# Patient Record
Sex: MALE | Race: WHITE | Employment: FULL TIME | URBAN - METROPOLITAN AREA
[De-identification: names, ages, dates, MRNs, and addresses within clinical notes are randomized per-mention and may not be internally consistent; named-entity substitution may affect disease eponyms.]

---

## 2024-02-22 ENCOUNTER — OFFICE VISIT (OUTPATIENT)
Dept: ENT CLINIC | Age: 49
End: 2024-02-22

## 2024-02-22 VITALS
WEIGHT: 161 LBS | RESPIRATION RATE: 16 BRPM | BODY MASS INDEX: 23.05 KG/M2 | HEIGHT: 70 IN | DIASTOLIC BLOOD PRESSURE: 73 MMHG | OXYGEN SATURATION: 98 % | SYSTOLIC BLOOD PRESSURE: 117 MMHG | HEART RATE: 86 BPM

## 2024-02-22 DIAGNOSIS — J34.89 NASAL OBSTRUCTION: ICD-10-CM

## 2024-02-22 DIAGNOSIS — J34.2 DEVIATED NASAL SEPTUM: ICD-10-CM

## 2024-02-22 DIAGNOSIS — H04.551 ACQUIRED OBSTRUCTION OF RIGHT NASOLACRIMAL DUCT: ICD-10-CM

## 2024-02-22 DIAGNOSIS — H04.201 RIGHT EPIPHORA: Primary | ICD-10-CM

## 2024-02-22 DIAGNOSIS — J32.9 CHRONIC SINUSITIS, UNSPECIFIED LOCATION: ICD-10-CM

## 2024-02-22 RX ORDER — SILDENAFIL CITRATE 20 MG/1
TABLET ORAL
COMMUNITY
Start: 2024-02-19

## 2024-02-22 NOTE — PROGRESS NOTES
[facility]  DIVISION OF OTOLARYNGOLOGY- HEAD & NECK SURGERY  CONSULT      Brian Inman (:  1975) is a 48 y.o. male, here for evaluation of the following chief complaint(s):  Sinus Problem (Blocked tear duct/Dr. Milian)      ASSESSMENT/PLAN:  1. Right epiphora  2. Acquired obstruction of right nasolacrimal duct  3. Nasal obstruction  4. Deviated nasal septum  5. Chronic sinusitis, unspecified location  -     CT SINUS FOR IMAGE GUIDANCE; Future      This is a very pleasant 48 y.o. male here today for evaluation of the the above-noted complaints.      Patient has a history of right-sided epiphora and acquired obstruction of the nasolacrimal duct as well as nasal obstruction and deviated nasal septum.  The patient has tried appropriate medical therapy and would be a candidate for surgery.  We discussed that we will perform possible septoplasty, endoscopic dacryocystorhinostomy and stenting.    Will obtain a CT scan of his sinuses to allow for intraoperative neuronavigation and to ensure that he is not suffering from underlying chronic sinusitis or unusual anatomic variants which could complicate surgery..    -Risks and benefits of septoplasty including pain, inflammation, infection, hemorrhage, cosmesis (including nasal valve collapse or saddle nose deformity), worsened nasal airway obstruction, hyposmia/anosmia, numbness to the teeth or gums, injury to the septum including septal perforation, need for further surgery  -Risks of sinus surgery/endoscopic dacryocystorhinostomy include anosmia/hyposmia, dysgeusia, hemorrhage, pain, infection/inflammation, numbness to the maxillary tissues or dentition, CSF leak (with risk of meningitis or intracranial infection), orbital complications (diplopia, blurry vision, blindness), need for further procedures  -Anesthesia carries its own complications which will need to be discussed with the Anesthesiology team on the day of surgery  -Patient will need to obtain PCP

## 2024-02-27 ENCOUNTER — PREP FOR PROCEDURE (OUTPATIENT)
Dept: ENT CLINIC | Age: 49
End: 2024-02-27

## 2024-02-27 DIAGNOSIS — H04.551: ICD-10-CM

## 2024-02-27 DIAGNOSIS — J34.2 DEVIATED NASAL SEPTUM: ICD-10-CM

## 2024-02-27 DIAGNOSIS — H04.203 EPIPHORA, BILATERAL: ICD-10-CM

## 2024-02-27 DIAGNOSIS — R68.89 NASAL AIRWAY ABNORMALITY: ICD-10-CM

## 2024-02-27 DIAGNOSIS — J34.3 HYPERTROPHY OF INFERIOR NASAL TURBINATE: ICD-10-CM

## 2024-02-27 RX ORDER — SODIUM CHLORIDE 0.9 % (FLUSH) 0.9 %
5-40 SYRINGE (ML) INJECTION EVERY 12 HOURS SCHEDULED
Status: CANCELLED | OUTPATIENT
Start: 2024-02-27

## 2024-02-27 RX ORDER — SODIUM CHLORIDE 0.9 % (FLUSH) 0.9 %
5-40 SYRINGE (ML) INJECTION PRN
Status: CANCELLED | OUTPATIENT
Start: 2024-02-27

## 2024-02-27 RX ORDER — SODIUM CHLORIDE 9 MG/ML
INJECTION, SOLUTION INTRAVENOUS PRN
Status: CANCELLED | OUTPATIENT
Start: 2024-02-27

## 2024-03-01 PROBLEM — H04.551: Status: ACTIVE | Noted: 2024-02-27

## 2024-03-06 ENCOUNTER — TELEPHONE (OUTPATIENT)
Dept: ENT CLINIC | Age: 49
End: 2024-03-06

## 2024-03-06 ENCOUNTER — HOSPITAL ENCOUNTER (OUTPATIENT)
Dept: CT IMAGING | Age: 49
Discharge: HOME | End: 2024-03-06
Attending: STUDENT IN AN ORGANIZED HEALTH CARE EDUCATION/TRAINING PROGRAM
Payer: COMMERCIAL

## 2024-03-06 DIAGNOSIS — J32.9 CHRONIC SINUSITIS, UNSPECIFIED LOCATION: ICD-10-CM

## 2024-03-06 PROCEDURE — 70486 CT MAXILLOFACIAL W/O DYE: CPT

## 2024-03-25 NOTE — PROGRESS NOTES
[facility] PRE-OPERATIVE INSTRUCTIONS    Day of Procedure: 4/11           Arrival time:        6        Surgery time:730    Take the following medications with a sip of water:  Follow your MD/Surgeons pre-procedure instructions regarding your medications     Do not eat or drink anything after 12:00 midnight prior to your surgery.  This includes water chewing gum, mints and ice chips.   You may brush your teeth and gargle the morning of your surgery, but do not swallow the water     Please see your family doctor/pediatrician for a history and physical and/or concerning medications.   Bring any test results/reports from your physicians office.   If you are under the care of a heart doctor or specialist doctor, please be aware that you may be asked to them for clearance    You may be asked to stop blood thinners such as Coumadin, Plavix, Fragmin, Lovenox, etc., or any anti-inflammatories such as:  Aspirin, Ibuprofen, Advil, Naproxen prior to your surgery.    We also ask that you stop any OTC medications such as fish oil, vitamin E, glucosamine, garlic, Multivitamins, COQ 10, etc.    We ask that you do not smoke 24 hours prior to surgery  We ask that you do not  drink any alcoholic beverages 24 hours prior to surgery     You must make arrangements for a responsible adult to take you home after your surgery.    For your safety you will not be allowed to leave alone or drive yourself home.  Your surgery will be cancelled if you do not have a ride home.     Also for your safety, it is strongly suggested that someone stay with you the first 24 hours after your surgery.     A parent or legal guardian must accompany a child scheduled for surgery and plan to stay at the hospital until the child is discharged.    Please do not bring other children with you.    For your comfort, please wear simple loose fitting clothing to the hospital.  Please do not bring valuables.    Do not wear any make-up or nail polish

## 2024-03-25 NOTE — PROGRESS NOTES
[facility] Pre-Admission Testing Electronic Communication Worksheet for OR/ENDO Procedures        Patient: Brian Inman    DOS:     Transportation Confirmed: [] YES    []  NO    History and Physical:  [] YES    []  NO  [] N/A  If yes, please list doctor or Urgent Care and date of H&P:     Additional Clearance(Cardiac, Pulmonary, etc):  [] YES    []  NO    Pre-Admission Testing Visit:  [] YES    []  NO If no, do labs/testing need to be done DOS?  [] YES    []  NO    Medication Reconciliation Complete:  [] YES    []  NO        Additional Notes:                Interview Complete: [] YES    []  NO          Shira Fraser RN  2:00 PM    [facility] Pre-Admission Testing Electronic Communication Worksheet for OR/ENDO Procedures        Patient: Brian Inman    DOS:     Transportation Confirmed: [] YES    []  NO    History and Physical:  [] YES    []  NO  [] N/A  If yes, please list doctor or Urgent Care and date of H&P:     Additional Clearance(Cardiac, Pulmonary, etc):  [] YES    []  NO    Pre-Admission Testing Visit:  [] YES    []  NO If no, do labs/testing need to be done DOS?  [] YES    []  NO    Medication Reconciliation Complete:  [] YES    []  NO        Additional Notes:                Interview Complete: [] YES    []  NO          Shira Fraser RN  2:00 PM    [facility] Pre-Admission Testing Electronic Communication Worksheet for OR/ENDO Procedures        Patient: Brian Inman    DOS:     Transportation Confirmed: [] YES    []  NO    History and Physical:  [] YES    []  NO  [] N/A  If yes, please list doctor or Urgent Care and date of H&P:     Additional Clearance(Cardiac, Pulmonary, etc):  [] YES    []  NO    Pre-Admission Testing Visit:  [] YES    []  NO If no, do labs/testing need to be done DOS?  [] YES    []  NO    Medication Reconciliation Complete:  [] YES    []  NO        Additional Notes:                Interview Complete: [] YES    []  NO          Shira Fraser RN  2:00  No

## 2024-04-10 ENCOUNTER — ANESTHESIA EVENT (OUTPATIENT)
Dept: OPERATING ROOM | Age: 49
End: 2024-04-10
Payer: COMMERCIAL

## 2024-04-11 ENCOUNTER — HOSPITAL ENCOUNTER (OUTPATIENT)
Age: 49
Setting detail: OUTPATIENT SURGERY
Discharge: HOME | End: 2024-04-11
Attending: STUDENT IN AN ORGANIZED HEALTH CARE EDUCATION/TRAINING PROGRAM | Admitting: STUDENT IN AN ORGANIZED HEALTH CARE EDUCATION/TRAINING PROGRAM
Payer: COMMERCIAL

## 2024-04-11 ENCOUNTER — ANESTHESIA (OUTPATIENT)
Dept: OPERATING ROOM | Age: 49
End: 2024-04-11
Payer: COMMERCIAL

## 2024-04-11 VITALS
BODY MASS INDEX: 23.19 KG/M2 | WEIGHT: 162 LBS | OXYGEN SATURATION: 96 % | HEIGHT: 70 IN | SYSTOLIC BLOOD PRESSURE: 108 MMHG | TEMPERATURE: 97 F | HEART RATE: 58 BPM | RESPIRATION RATE: 16 BRPM | DIASTOLIC BLOOD PRESSURE: 56 MMHG

## 2024-04-11 DIAGNOSIS — G89.18 POST-OP PAIN: Primary | ICD-10-CM

## 2024-04-11 PROCEDURE — 2720000010 HC SURG SUPPLY STERILE: Performed by: STUDENT IN AN ORGANIZED HEALTH CARE EDUCATION/TRAINING PROGRAM

## 2024-04-11 PROCEDURE — 3600000015 HC SURGERY LEVEL 5 ADDTL 15MIN: Performed by: STUDENT IN AN ORGANIZED HEALTH CARE EDUCATION/TRAINING PROGRAM

## 2024-04-11 PROCEDURE — 6360000002 HC RX W HCPCS

## 2024-04-11 PROCEDURE — 2580000003 HC RX 258: Performed by: STUDENT IN AN ORGANIZED HEALTH CARE EDUCATION/TRAINING PROGRAM

## 2024-04-11 PROCEDURE — 6370000000 HC RX 637 (ALT 250 FOR IP): Performed by: STUDENT IN AN ORGANIZED HEALTH CARE EDUCATION/TRAINING PROGRAM

## 2024-04-11 PROCEDURE — 7100000010 HC PHASE II RECOVERY - FIRST 15 MIN: Performed by: STUDENT IN AN ORGANIZED HEALTH CARE EDUCATION/TRAINING PROGRAM

## 2024-04-11 PROCEDURE — A4217 STERILE WATER/SALINE, 500 ML: HCPCS | Performed by: STUDENT IN AN ORGANIZED HEALTH CARE EDUCATION/TRAINING PROGRAM

## 2024-04-11 PROCEDURE — 2500000003 HC RX 250 WO HCPCS

## 2024-04-11 PROCEDURE — 3700000001 HC ADD 15 MINUTES (ANESTHESIA): Performed by: STUDENT IN AN ORGANIZED HEALTH CARE EDUCATION/TRAINING PROGRAM

## 2024-04-11 PROCEDURE — 2580000003 HC RX 258: Performed by: ANESTHESIOLOGY

## 2024-04-11 PROCEDURE — 7100000001 HC PACU RECOVERY - ADDTL 15 MIN: Performed by: STUDENT IN AN ORGANIZED HEALTH CARE EDUCATION/TRAINING PROGRAM

## 2024-04-11 PROCEDURE — 6360000002 HC RX W HCPCS: Performed by: STUDENT IN AN ORGANIZED HEALTH CARE EDUCATION/TRAINING PROGRAM

## 2024-04-11 PROCEDURE — 2500000003 HC RX 250 WO HCPCS: Performed by: OPHTHALMOLOGY

## 2024-04-11 PROCEDURE — 2500000003 HC RX 250 WO HCPCS: Performed by: STUDENT IN AN ORGANIZED HEALTH CARE EDUCATION/TRAINING PROGRAM

## 2024-04-11 PROCEDURE — 6360000002 HC RX W HCPCS: Performed by: ANESTHESIOLOGY

## 2024-04-11 PROCEDURE — 2709999900 HC NON-CHARGEABLE SUPPLY: Performed by: STUDENT IN AN ORGANIZED HEALTH CARE EDUCATION/TRAINING PROGRAM

## 2024-04-11 PROCEDURE — 3600000005 HC SURGERY LEVEL 5 BASE: Performed by: STUDENT IN AN ORGANIZED HEALTH CARE EDUCATION/TRAINING PROGRAM

## 2024-04-11 PROCEDURE — C2625 STENT, NON-COR, TEM W/DEL SY: HCPCS | Performed by: STUDENT IN AN ORGANIZED HEALTH CARE EDUCATION/TRAINING PROGRAM

## 2024-04-11 PROCEDURE — 7100000000 HC PACU RECOVERY - FIRST 15 MIN: Performed by: STUDENT IN AN ORGANIZED HEALTH CARE EDUCATION/TRAINING PROGRAM

## 2024-04-11 PROCEDURE — 7100000011 HC PHASE II RECOVERY - ADDTL 15 MIN: Performed by: STUDENT IN AN ORGANIZED HEALTH CARE EDUCATION/TRAINING PROGRAM

## 2024-04-11 PROCEDURE — 3700000000 HC ANESTHESIA ATTENDED CARE: Performed by: STUDENT IN AN ORGANIZED HEALTH CARE EDUCATION/TRAINING PROGRAM

## 2024-04-11 DEVICE — PROPEL CONTOUR SINUS IMPLANT
Type: IMPLANTABLE DEVICE | Site: EYE | Status: FUNCTIONAL
Brand: PROPEL CONTOUR

## 2024-04-11 RX ORDER — SODIUM CHLORIDE 0.9 % (FLUSH) 0.9 %
5-40 SYRINGE (ML) INJECTION EVERY 12 HOURS SCHEDULED
Status: DISCONTINUED | OUTPATIENT
Start: 2024-04-11 | End: 2024-04-11 | Stop reason: HOSPADM

## 2024-04-11 RX ORDER — PROPOFOL 10 MG/ML
INJECTION, EMULSION INTRAVENOUS PRN
Status: DISCONTINUED | OUTPATIENT
Start: 2024-04-11 | End: 2024-04-11 | Stop reason: SDUPTHER

## 2024-04-11 RX ORDER — ONDANSETRON 2 MG/ML
INJECTION INTRAMUSCULAR; INTRAVENOUS PRN
Status: DISCONTINUED | OUTPATIENT
Start: 2024-04-11 | End: 2024-04-11 | Stop reason: SDUPTHER

## 2024-04-11 RX ORDER — ECHINACEA PURPUREA EXTRACT 125 MG
TABLET ORAL
Qty: 30 ML | Refills: 1 | Status: SHIPPED | OUTPATIENT
Start: 2024-04-11

## 2024-04-11 RX ORDER — SODIUM CHLORIDE 0.9 % (FLUSH) 0.9 %
5-40 SYRINGE (ML) INJECTION PRN
Status: DISCONTINUED | OUTPATIENT
Start: 2024-04-11 | End: 2024-04-11 | Stop reason: HOSPADM

## 2024-04-11 RX ORDER — SODIUM CHLORIDE 9 MG/ML
INJECTION, SOLUTION INTRAVENOUS PRN
Status: DISCONTINUED | OUTPATIENT
Start: 2024-04-11 | End: 2024-04-11 | Stop reason: HOSPADM

## 2024-04-11 RX ORDER — DEXMEDETOMIDINE HYDROCHLORIDE 100 UG/ML
INJECTION, SOLUTION INTRAVENOUS PRN
Status: DISCONTINUED | OUTPATIENT
Start: 2024-04-11 | End: 2024-04-11 | Stop reason: SDUPTHER

## 2024-04-11 RX ORDER — FENTANYL CITRATE 50 UG/ML
INJECTION, SOLUTION INTRAMUSCULAR; INTRAVENOUS PRN
Status: DISCONTINUED | OUTPATIENT
Start: 2024-04-11 | End: 2024-04-11 | Stop reason: SDUPTHER

## 2024-04-11 RX ORDER — CEPHALEXIN 500 MG/1
500 CAPSULE ORAL 2 TIMES DAILY
Qty: 20 CAPSULE | Refills: 0 | Status: SHIPPED | OUTPATIENT
Start: 2024-04-11 | End: 2024-04-21

## 2024-04-11 RX ORDER — NALOXONE HYDROCHLORIDE 0.4 MG/ML
INJECTION, SOLUTION INTRAMUSCULAR; INTRAVENOUS; SUBCUTANEOUS PRN
Status: DISCONTINUED | OUTPATIENT
Start: 2024-04-11 | End: 2024-04-11 | Stop reason: HOSPADM

## 2024-04-11 RX ORDER — LIDOCAINE HYDROCHLORIDE 20 MG/ML
INJECTION, SOLUTION EPIDURAL; INFILTRATION; INTRACAUDAL; PERINEURAL PRN
Status: DISCONTINUED | OUTPATIENT
Start: 2024-04-11 | End: 2024-04-11 | Stop reason: SDUPTHER

## 2024-04-11 RX ORDER — DEXAMETHASONE SODIUM PHOSPHATE 4 MG/ML
INJECTION, SOLUTION INTRA-ARTICULAR; INTRALESIONAL; INTRAMUSCULAR; INTRAVENOUS; SOFT TISSUE PRN
Status: DISCONTINUED | OUTPATIENT
Start: 2024-04-11 | End: 2024-04-11 | Stop reason: SDUPTHER

## 2024-04-11 RX ORDER — MIDAZOLAM HYDROCHLORIDE 1 MG/ML
INJECTION INTRAMUSCULAR; INTRAVENOUS PRN
Status: DISCONTINUED | OUTPATIENT
Start: 2024-04-11 | End: 2024-04-11 | Stop reason: SDUPTHER

## 2024-04-11 RX ORDER — ONDANSETRON 2 MG/ML
4 INJECTION INTRAMUSCULAR; INTRAVENOUS
Status: DISCONTINUED | OUTPATIENT
Start: 2024-04-11 | End: 2024-04-11 | Stop reason: HOSPADM

## 2024-04-11 RX ORDER — BALANCED SALT SOLUTION 6.4; .75; .48; .3; 3.9; 1.7 MG/ML; MG/ML; MG/ML; MG/ML; MG/ML; MG/ML
SOLUTION OPHTHALMIC
Status: COMPLETED | OUTPATIENT
Start: 2024-04-11 | End: 2024-04-11

## 2024-04-11 RX ORDER — MAGNESIUM HYDROXIDE 1200 MG/15ML
LIQUID ORAL CONTINUOUS PRN
Status: DISCONTINUED | OUTPATIENT
Start: 2024-04-11 | End: 2024-04-11 | Stop reason: HOSPADM

## 2024-04-11 RX ORDER — SODIUM CHLORIDE 0.9 % (FLUSH) 0.9 %
5-40 SYRINGE (ML) INJECTION PRN
Status: DISCONTINUED | OUTPATIENT
Start: 2024-04-11 | End: 2024-04-11 | Stop reason: SDUPTHER

## 2024-04-11 RX ORDER — FENTANYL CITRATE 0.05 MG/ML
25 INJECTION, SOLUTION INTRAMUSCULAR; INTRAVENOUS EVERY 5 MIN PRN
Status: DISCONTINUED | OUTPATIENT
Start: 2024-04-11 | End: 2024-04-11 | Stop reason: HOSPADM

## 2024-04-11 RX ORDER — HYDROCODONE BITARTRATE AND ACETAMINOPHEN 5; 325 MG/1; MG/1
1 TABLET ORAL EVERY 6 HOURS PRN
Qty: 12 TABLET | Refills: 0 | Status: SHIPPED | OUTPATIENT
Start: 2024-04-11 | End: 2024-04-14

## 2024-04-11 RX ORDER — EPINEPHRINE 1 MG/ML
INJECTION, SOLUTION, CONCENTRATE INTRAVENOUS
Status: COMPLETED | OUTPATIENT
Start: 2024-04-11 | End: 2024-04-11

## 2024-04-11 RX ORDER — OXYCODONE HYDROCHLORIDE 5 MG/1
5 TABLET ORAL ONCE
Status: DISCONTINUED | OUTPATIENT
Start: 2024-04-11 | End: 2024-04-11 | Stop reason: HOSPADM

## 2024-04-11 RX ORDER — TOBRAMYCIN AND DEXAMETHASONE 3; 1 MG/ML; MG/ML
1 SUSPENSION/ DROPS OPHTHALMIC 3 TIMES DAILY
Qty: 1 EACH | Refills: 0 | Status: SHIPPED | OUTPATIENT
Start: 2024-04-11 | End: 2024-04-18

## 2024-04-11 RX ORDER — SODIUM CHLORIDE 9 MG/ML
INJECTION, SOLUTION INTRAVENOUS PRN
Status: DISCONTINUED | OUTPATIENT
Start: 2024-04-11 | End: 2024-04-11 | Stop reason: SDUPTHER

## 2024-04-11 RX ORDER — SODIUM CHLORIDE 0.9 % (FLUSH) 0.9 %
5-40 SYRINGE (ML) INJECTION EVERY 12 HOURS SCHEDULED
Status: DISCONTINUED | OUTPATIENT
Start: 2024-04-11 | End: 2024-04-11 | Stop reason: SDUPTHER

## 2024-04-11 RX ORDER — ACETAMINOPHEN 500 MG
1000 TABLET ORAL ONCE
Status: COMPLETED | OUTPATIENT
Start: 2024-04-11 | End: 2024-04-11

## 2024-04-11 RX ORDER — TRIAMCINOLONE ACETONIDE 40 MG/ML
INJECTION, SUSPENSION INTRA-ARTICULAR; INTRAMUSCULAR
Status: COMPLETED | OUTPATIENT
Start: 2024-04-11 | End: 2024-04-11

## 2024-04-11 RX ORDER — MEPERIDINE HYDROCHLORIDE 25 MG/ML
12.5 INJECTION INTRAMUSCULAR; INTRAVENOUS; SUBCUTANEOUS EVERY 5 MIN PRN
Status: DISCONTINUED | OUTPATIENT
Start: 2024-04-11 | End: 2024-04-11 | Stop reason: HOSPADM

## 2024-04-11 RX ORDER — LIDOCAINE HYDROCHLORIDE AND EPINEPHRINE 10; 10 MG/ML; UG/ML
INJECTION, SOLUTION INFILTRATION; PERINEURAL
Status: COMPLETED | OUTPATIENT
Start: 2024-04-11 | End: 2024-04-11

## 2024-04-11 RX ORDER — ROCURONIUM BROMIDE 10 MG/ML
INJECTION, SOLUTION INTRAVENOUS PRN
Status: DISCONTINUED | OUTPATIENT
Start: 2024-04-11 | End: 2024-04-11 | Stop reason: SDUPTHER

## 2024-04-11 RX ORDER — FENTANYL CITRATE 0.05 MG/ML
50 INJECTION, SOLUTION INTRAMUSCULAR; INTRAVENOUS EVERY 5 MIN PRN
Status: DISCONTINUED | OUTPATIENT
Start: 2024-04-11 | End: 2024-04-11 | Stop reason: HOSPADM

## 2024-04-11 RX ORDER — SUCCINYLCHOLINE/SOD CL,ISO/PF 200MG/10ML
SYRINGE (ML) INTRAVENOUS PRN
Status: DISCONTINUED | OUTPATIENT
Start: 2024-04-11 | End: 2024-04-11 | Stop reason: SDUPTHER

## 2024-04-11 RX ADMIN — LIDOCAINE HYDROCHLORIDE 60 MG: 20 INJECTION, SOLUTION EPIDURAL; INFILTRATION; INTRACAUDAL; PERINEURAL at 07:52

## 2024-04-11 RX ADMIN — SUGAMMADEX 200 MG: 100 INJECTION, SOLUTION INTRAVENOUS at 08:57

## 2024-04-11 RX ADMIN — DEXMEDETOMIDINE HYDROCHLORIDE 4 MCG: 100 INJECTION, SOLUTION INTRAVENOUS at 08:02

## 2024-04-11 RX ADMIN — Medication 140 MG: at 07:52

## 2024-04-11 RX ADMIN — DEXMEDETOMIDINE HYDROCHLORIDE 4 MCG: 100 INJECTION, SOLUTION INTRAVENOUS at 08:33

## 2024-04-11 RX ADMIN — DEXMEDETOMIDINE HYDROCHLORIDE 4 MCG: 100 INJECTION, SOLUTION INTRAVENOUS at 08:41

## 2024-04-11 RX ADMIN — ACETAMINOPHEN 1000 MG: 500 TABLET ORAL at 09:58

## 2024-04-11 RX ADMIN — ROCURONIUM BROMIDE 5 MG: 10 SOLUTION INTRAVENOUS at 07:52

## 2024-04-11 RX ADMIN — FENTANYL CITRATE 25 MCG: 0.05 INJECTION, SOLUTION INTRAMUSCULAR; INTRAVENOUS at 09:42

## 2024-04-11 RX ADMIN — SODIUM CHLORIDE: 9 INJECTION, SOLUTION INTRAVENOUS at 07:46

## 2024-04-11 RX ADMIN — DEXAMETHASONE SODIUM PHOSPHATE 10 MG: 4 INJECTION, SOLUTION INTRAMUSCULAR; INTRAVENOUS at 08:02

## 2024-04-11 RX ADMIN — DEXMEDETOMIDINE HYDROCHLORIDE 4 MCG: 100 INJECTION, SOLUTION INTRAVENOUS at 08:17

## 2024-04-11 RX ADMIN — ONDANSETRON 4 MG: 2 INJECTION INTRAMUSCULAR; INTRAVENOUS at 08:40

## 2024-04-11 RX ADMIN — MIDAZOLAM 2 MG: 1 INJECTION INTRAMUSCULAR; INTRAVENOUS at 07:46

## 2024-04-11 RX ADMIN — PROPOFOL 200 MG: 10 INJECTION, EMULSION INTRAVENOUS at 07:52

## 2024-04-11 RX ADMIN — FENTANYL CITRATE 50 MCG: 50 INJECTION INTRAMUSCULAR; INTRAVENOUS at 07:52

## 2024-04-11 RX ADMIN — ROCURONIUM BROMIDE 45 MG: 10 SOLUTION INTRAVENOUS at 08:03

## 2024-04-11 RX ADMIN — FENTANYL CITRATE 50 MCG: 50 INJECTION INTRAMUSCULAR; INTRAVENOUS at 08:02

## 2024-04-11 ASSESSMENT — PAIN DESCRIPTION - ONSET
ONSET: GRADUAL
ONSET: ON-GOING

## 2024-04-11 ASSESSMENT — PAIN DESCRIPTION - LOCATION
LOCATION: EYE

## 2024-04-11 ASSESSMENT — PAIN DESCRIPTION - ORIENTATION
ORIENTATION: RIGHT

## 2024-04-11 ASSESSMENT — PAIN DESCRIPTION - FREQUENCY
FREQUENCY: CONTINUOUS

## 2024-04-11 ASSESSMENT — PAIN SCALES - GENERAL
PAINLEVEL_OUTOF10: 6
PAINLEVEL_OUTOF10: 2
PAINLEVEL_OUTOF10: 4
PAINLEVEL_OUTOF10: 3
PAINLEVEL_OUTOF10: 5
PAINLEVEL_OUTOF10: 3
PAINLEVEL_OUTOF10: 3

## 2024-04-11 ASSESSMENT — PAIN - FUNCTIONAL ASSESSMENT
PAIN_FUNCTIONAL_ASSESSMENT: PREVENTS OR INTERFERES SOME ACTIVE ACTIVITIES AND ADLS
PAIN_FUNCTIONAL_ASSESSMENT: 0-10
PAIN_FUNCTIONAL_ASSESSMENT: PREVENTS OR INTERFERES SOME ACTIVE ACTIVITIES AND ADLS

## 2024-04-11 ASSESSMENT — PAIN DESCRIPTION - DESCRIPTORS
DESCRIPTORS: SORE;THROBBING
DESCRIPTORS: THROBBING
DESCRIPTORS: PRESSURE;THROBBING
DESCRIPTORS: SORE;THROBBING
DESCRIPTORS: DISCOMFORT;SORE
DESCRIPTORS: SORE;THROBBING

## 2024-04-11 ASSESSMENT — PAIN DESCRIPTION - PAIN TYPE
TYPE: SURGICAL PAIN

## 2024-04-11 NOTE — DISCHARGE INSTRUCTIONS
instructional video can be seen at: [email or web address].com/watch?[email or web address]  The goal is to gently irrigate both nostrils so that any residual blood clots can be flushed from your nose.    If you start having ear pain after irrigations do not squeeze the bottle as firmly.

## 2024-04-11 NOTE — PROGRESS NOTES
Dr Torres at bedside talking with patient.   Patient vital signs are at baseline: Yes  Patient able to ambulate as they were prior to admission or with assist devices provided by therapies during their stay:  Yes  Patient MUST void prior to discharge:  Yes  Patient able to tolerate oral intake:  Yes  Pain has adequate pain control using Oral analgesics:  Yes  Does patient have an identified :  Yes  Has goal D/C date and time been discussed with patient:  Yes    Pt stable and ready to discharge.

## 2024-04-11 NOTE — ANESTHESIA POSTPROCEDURE EVALUATION
Department of Anesthesiology  Postprocedure Note    Patient: Brian Inman  MRN: [id number]  YOB: 1975  Date of evaluation: 4/11/2024    Procedure Summary       Date: 04/11/24 Room / Location: [address]    Anesthesia Start: 0746 Anesthesia Stop: 0916    Procedures:       ENDOSCOPIC SEPTOPLASTY, ANTERIOR ETHMOIDECTOMY  IMAGE GUIDANCE (Nose)      RIGHT ENDOSCOPIC DACRYOCYSTORHINOSTOMY (Right: Eye) Diagnosis:       Deviated nasal septum      Nasal airway abnormality      Epiphora, bilateral      Hypertrophy of inferior nasal turbinate      Acquired stenosis of nasolacrimal duct, right      (Deviated nasal septum [J34.2])      (Nasal airway abnormality [R68.89])      (Epiphora, bilateral [H04.203])      (Hypertrophy of inferior nasal turbinate [J34.3])      (Acquired stenosis of nasolacrimal duct, right [H04.551])    Surgeons: Abdullahi Torres MD; Opal Milian MD Responsible Provider: Aneudy Parikh MD    Anesthesia Type: general ASA Status: 1            Anesthesia Type: No value filed.    Michael Phase I: Michael Score: 10    Michael Phase II: Michael Score: 10    Anesthesia Post Evaluation    Patient location during evaluation: PACU  Patient participation: complete - patient participated  Level of consciousness: awake and alert  Pain score: 0  Airway patency: patent  Nausea & Vomiting: no nausea and no vomiting  Cardiovascular status: blood pressure returned to baseline  Respiratory status: acceptable  Hydration status: euvolemic  Pain management: adequate    No notable events documented.

## 2024-04-11 NOTE — PROGRESS NOTES
Patient opens eyes to name, very drowsy. Follows commands.Patient denies C/O nausea. Patient states surgical pain to right eye area 2 of 10 and tolerable. Denies nose discomfort. No drainage noted. VSS. Resp easy unlabored on room air O2 with SaO2 97%.

## 2024-04-11 NOTE — PROGRESS NOTES
Patient to Phase 2 from PACU. Pt awake and alert. VS stable (see flowsheet) on room air. Patient's breathing regular and unlabored, rates pain 3/10 in face under eyes. Surgical dressing clean, dry and intact. Call light within reach. Snack provided.

## 2024-04-11 NOTE — PROGRESS NOTES
Patient awake and alert. Resp easy unlabored on room air O2 with SaO2 96%. Surgical pain at 3 of 10 and tolerable to right eye. Patient denies C/O nose pain. Patient denies nausea. VSS. Patient stable to transfer to ACU for phase II.

## 2024-04-11 NOTE — ANESTHESIA PRE PROCEDURE
Department of Anesthesiology  Preprocedure Note       Name:  Brian Inman   Age:  49 y.o.  :  1975                                          MRN:  [id number]         Date:  2024      Surgeon: Surgeon(s):  Abdullahi Torres MD Rivera, Roxana Y, MD    Procedure: Procedure(s):  ENDOSCOPIC SEPTOPLASTY, ANTERIOR ETHMOIDECTOMY  IMAGE GUIDANCE  RIGHT ENDOSCOPIC DACRYOCYSTORHINOSTOMY    Medications prior to admission:   Prior to Admission medications    Medication Sig Start Date End Date Taking? Authorizing Provider   sildenafil (REVATIO) 20 MG tablet 3-5 tablet an hour before sex as needed no more than once daily. 24   Provider, MD Danya       Current medications:    Current Facility-Administered Medications   Medication Dose Route Frequency Provider Last Rate Last Admin    sodium chloride flush 0.9 % injection 5-40 mL  5-40 mL IntraVENous 2 times per day Christine Barker MD        sodium chloride flush 0.9 % injection 5-40 mL  5-40 mL IntraVENous PRN Christine Barker MD        0.9 % sodium chloride infusion   IntraVENous PRN Christine Barker MD           Allergies:    Allergies   Allergen Reactions    Doxycycline Calcium Rash       Problem List:    Patient Active Problem List   Diagnosis Code    Deviated nasal septum J34.2    Nasal airway abnormality R68.89    Epiphora, bilateral H04.203    Hypertrophy of inferior nasal turbinate J34.3    Acquired stenosis of nasolacrimal duct, right H04.551       Past Medical History:  History reviewed. No pertinent past medical history.    Past Surgical History:        Procedure Laterality Date    CLEFT PALATE REPAIR      COLONOSCOPY         Social History:    Social History     Tobacco Use    Smoking status: Never     Passive exposure: Never    Smokeless tobacco: Never   Substance Use Topics    Alcohol use: Not Currently                                Counseling given: Not Answered      Vital Signs (Current):   Vitals:    24 1352 24 0635

## 2024-04-11 NOTE — H&P
[facility]  DIVISION OF OTOLARYNGOLOGY- HEAD & NECK SURGERY  CONSULT      Brian Inman (:  1975) is a 48 y.o. male, here for evaluation of the following chief complaint(s):  Sinus Problem (Blocked tear duct/Dr. Milian)      ASSESSMENT/PLAN:  1. Right epiphora  2. Acquired obstruction of right nasolacrimal duct  3. Nasal obstruction  4. Deviated nasal septum  5. Chronic sinusitis, unspecified location  -     CT SINUS FOR IMAGE GUIDANCE; Future      This is a very pleasant 48 y.o. male here today for evaluation of the the above-noted complaints.          -Risks and benefits of septoplasty/sinus surgery including pain, inflammation, infection, hemorrhage, cosmesis (including nasal valve collapse or saddle nose deformity), worsened nasal airway obstruction, hyposmia/anosmia, numbness to the teeth or gums, injury to the septum including septal perforation, need for further surgery  -Risks of sinus surgery/endoscopic dacryocystorhinostomy include anosmia/hyposmia, dysgeusia, hemorrhage, pain, infection/inflammation, numbness to the maxillary tissues or dentition, CSF leak (with risk of meningitis or intracranial infection), orbital complications (diplopia, blurry vision, blindness), need for further procedures

## 2024-04-11 NOTE — OP NOTE
Operative Note      Patient: Brian Inman  YOB: 1975  MRN: [id number]    Date of Procedure: 4/11/2024    Pre-Op Diagnosis Codes:     * Deviated nasal septum [J34.2]     * Nasal airway abnormality [R68.89]     * Epiphora, bilateral [H04.203]     * Hypertrophy of inferior nasal turbinate [J34.3]     * Acquired stenosis of nasolacrimal duct, right [H04.551]    Post-Op Diagnosis: Same       Procedure(s):  ENDOSCOPIC SEPTOPLASTY, ANTERIOR ETHMOIDECTOMY  IMAGE GUIDANCE  RIGHT ENDOSCOPIC DACRYOCYSTORHINOSTOMY  RIGHT UPPER LID LACRIMAL PUNCTOPLASTY  RIGHT LACRIMAL INTUBATION    Surgeon(s):  Opal Milian MD Gelpi, Mark, MD    Assistant:   Surgical Assistant: Zully Abdi    Anesthesia: General    Estimated Blood Loss (mL): less than 50     Complications: None    Specimens:   * No specimens in log *    Implants:  Implant Name Type Inv. Item Serial No.  Lot No. LRB No. 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 He had history of a prior lacrimal surgery 20 years ago, and also history of cleft lip and palate surgery.   He was found to have complete stenosis of the right nasolacrimal duct with epiphora due to insufficient drainage. Surgical repair was, thus, medically necessary. The risks, alternatives, and benefits of an endoscopic DCR and lacrimal intubation were discussed and the patient elected to proceed with surgery.   This is a combined procedure with Dr. Torres, who will be

## 2024-04-11 NOTE — OP NOTE
[facility]  DIVISION OF OTOLARYNGOLOGY- HEAD & NECK SURGERY  OPERATIVE REPORT    Patient Name: Brian Inman  YOB: 1975  Medical Record Number:  [id number]  Billing Number:  [id number]  Date of Procedure:   Time: 0730    Pre Operative Diagnoses:   1.  Deviated nasal septum  2.  Nasal obstruction  3.  Epiphora  4.  Acquired obstruction of right nasolacrimal duct  5.  History of cleft palate and lip surgery  Post Operative Diagnoses:    Same           Procedure:      Panel 1- Dr Elsa Milian:  1. Right lateral lacrimal system intubation  2. Right endoscopic dacryocystorhinostomy     Panel 2-Dr. Abdullahi Torres  Right endoscopic dacryocystorhinostomy  2. Right anterior ethmoidectomy-CPT 63243  3. Sterotactic extradural image guidance- CPT 83669       Surgeon: Abdullahi Torres MD    OR Staff/ Assistant:  Circulator: Olinda Du RN; Deisy Smith RN  Surgical Assistant: Zully Abdi Scrub: Matilda Vargas  Scrub Person First: Liat Hall  Scrub Person Second: Carmel Julio RN  Circulator Assist: Renee Tovar RN    Anesthesia:  General anesthesia.     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  Prior DCR surgery with absence nasal and inferior orbital rim bone.  There was a large amount of inflammation of the lacrimal sac mucosa and inspissated mucus in area surrounding the sac.  3.  Andino monocannilicular stents and Kenalog soaked Gelfoam and Propel Contour were placed at the site of the DCR.  4.  Small amount of exposed dermis and orbicularis occuli muscle exposed at the time of surgery.       Indications:  This is a 49 y.o. male with acquired right nasolacrimal duct obstruction.  Risks and benefits discussed with the patient including alternate treatment options, Informed consent was obtained, the patient elected to proceed with the planned procedure.    DETAILS OF PROCEDURE(S):        Detailed Description of Procedure:   The patient was

## 2024-04-11 NOTE — PROGRESS NOTES
Reviewed dc instructions with pt wife. Pt wife verbalized understanding. PIV removed. Dressing clean dry and intact. Pt dc to private residence. Wheelchair to transport pt. To vehicle. Pt dc with personal belongings.

## 2024-04-11 NOTE — PROGRESS NOTES
Patient admitted to PACU from OR.Patient asleep.Resp easy unlabored on 100% NRB mask with SaO2 97%.  IV patent to left forearm. VSS. No drainage from nares noted. Ecchymosis noted under right from left inner corner to mid eye.

## 2024-04-11 NOTE — PROGRESS NOTES
Patient remains drowsy, opens eyes to name. Dr Osborn at bedside. Orders noted. Patient states pain level 4 of 10 and tolerable. VSS.

## 2024-04-24 ENCOUNTER — OFFICE VISIT (OUTPATIENT)
Dept: ENT CLINIC | Age: 49
End: 2024-04-24

## 2024-04-24 VITALS
BODY MASS INDEX: 22.48 KG/M2 | DIASTOLIC BLOOD PRESSURE: 74 MMHG | RESPIRATION RATE: 15 BRPM | TEMPERATURE: 97.8 F | HEIGHT: 70 IN | WEIGHT: 157 LBS | SYSTOLIC BLOOD PRESSURE: 123 MMHG

## 2024-04-24 DIAGNOSIS — J34.89 NASAL CRUSTING: ICD-10-CM

## 2024-04-24 DIAGNOSIS — J34.2 DEVIATED NASAL SEPTUM: ICD-10-CM

## 2024-04-24 DIAGNOSIS — H04.201 RIGHT EPIPHORA: Primary | ICD-10-CM

## 2024-04-24 NOTE — PROGRESS NOTES
[facility]  DIVISION OF OTOLARYNGOLOGY- HEAD & NECK SURGERY  CONSULT      Brian Inman (:  1975) is a 49 y.o. male, here for evaluation of the following chief complaint(s):  Post-Op Check (Pt stated that he has felt good since surgery. Pt stated that he has no pain swelling or bleeding just a little tender still )      ASSESSMENT/PLAN:  1. Right epiphora  2. Deviated nasal septum  3. Nasal crusting      This is a very pleasant 49 y.o. male here today for evaluation of the the above-noted complaints.      -Patient is status post endoscopic dacryocystorhinostomy, anterior ethmoidectomy on 2024.    -Finished perioperative medications  -Continue saline irrigation  -Follow-up in 1 month        Medical Decision Making:  The following items were considered in medical decision making:  Independent review of images  Review / order clinical lab tests  Review / order radiology tests  Decision to obtain old records  Review and summation of [facility]    SUBJECTIVE/OBJECTIVE:  HPI    Brian Inman is here today for evaluation of acquired obstruction of the right nasolacrimal duct and resultant epiphora.  Patient states that this has been going on since early .  He reportedly had a surgery in that time to close the tear duct.  Over the last year he has been getting increased tearing in the right eye with purulent drainage.  He has been treated with antibiotic drops/steroid eyedrops and antibiotics.  He has a past medical history significant for cleft lip and palate reconstruction.    Patient also has a history of nasal obstruction.  He has tried fluticasone in the past with no significant improvement.  He has also tried nasal saline.  No history of nasal or sinus surgery, imaging of the sinuses.    Update 2024:    Patient presents today for follow-up.  His bruising has improved.  No changes to his vision.  He still has some epiphora.  He is

## 2024-05-22 ENCOUNTER — OFFICE VISIT (OUTPATIENT)
Dept: ENT CLINIC | Age: 49
End: 2024-05-22
Payer: COMMERCIAL

## 2024-05-22 VITALS
TEMPERATURE: 97.7 F | HEIGHT: 70 IN | BODY MASS INDEX: 22.9 KG/M2 | HEART RATE: 65 BPM | SYSTOLIC BLOOD PRESSURE: 108 MMHG | OXYGEN SATURATION: 98 % | DIASTOLIC BLOOD PRESSURE: 66 MMHG | WEIGHT: 160 LBS

## 2024-05-22 DIAGNOSIS — H04.201 RIGHT EPIPHORA: Primary | ICD-10-CM

## 2024-05-22 DIAGNOSIS — J34.89 NASAL CRUSTING: ICD-10-CM

## 2024-05-22 PROCEDURE — 31231 NASAL ENDOSCOPY DX: CPT | Performed by: STUDENT IN AN ORGANIZED HEALTH CARE EDUCATION/TRAINING PROGRAM

## 2024-05-22 RX ORDER — TOBRAMYCIN AND DEXAMETHASONE 3; 1 MG/ML; MG/ML
1 SUSPENSION/ DROPS OPHTHALMIC 3 TIMES DAILY
Qty: 1 EACH | Refills: 0 | Status: SHIPPED | OUTPATIENT
Start: 2024-05-22 | End: 2024-05-29

## 2024-05-22 NOTE — PROGRESS NOTES
[facility]  DIVISION OF OTOLARYNGOLOGY- HEAD & NECK SURGERY  CONSULT      Brian Inman (:  1975) is a 49 y.o. male, here for evaluation of the following chief complaint(s):  Post-Op Check (Pt stated that he has been feeling great. )      ASSESSMENT/PLAN:  1. Right epiphora  2. Nasal crusting      This is a very pleasant 49 y.o. male here today for evaluation of the the above-noted complaints.      -Patient is status post endoscopic dacryocystorhinostomy, anterior ethmoidectomy on 2024.    -Stent was removed.  DCR site is widely patent.  -Patient is set to go on a cross-country bike tour.  I prescribed him a round of TobraDex to use in case he develops any inflammation during his ride.        Medical Decision Making:  The following items were considered in medical decision making:  Independent review of images  Review / order clinical lab tests  Review / order radiology tests  Decision to obtain old records  Review and summation of old records as accessed through [employer] if applicable    SUBJECTIVE/OBJECTIVE:  HPI    Brian Inman is here today for evaluation of acquired obstruction of the right nasolacrimal duct and resultant epiphora.  Patient states that this has been going on since early 's.  He reportedly had a surgery in that time to close the tear duct.  Over the last year he has been getting increased tearing in the right eye with purulent drainage.  He has been treated with antibiotic drops/steroid eyedrops and antibiotics.  He has a past medical history significant for cleft lip and palate reconstruction.    Patient also has a history of nasal obstruction.  He has tried fluticasone in the past with no significant improvement.  He has also tried nasal saline.  No history of nasal or sinus surgery, imaging of the sinuses.    Update 2024:    Patient presents today for follow-up.  His bruising has improved.  No changes to his vision.  He still has some epiphora.  He

## (undated) DEVICE — TUBING SUCT 8FR MAL ALUM SHFT FN CAP VENT UNIV CONN W/ OBT

## (undated) DEVICE — TUBING SUCT 12FR MAL ALUM SHFT FN CAP VENT UNIV CONN W/ OBT

## (undated) DEVICE — BUR 1882569HS 3PK CVD DIAMOND DCR 2.5MM

## (undated) DEVICE — INTUBATION SET MONO-CANALICULAR CRAWFORD: Brand: CRAWFORD

## (undated) DEVICE — BASIC SINGLE BASIN 1-LF: Brand: MEDLINE INDUSTRIES, INC.

## (undated) DEVICE — TUBING, SUCTION, 1/4" X 12', STRAIGHT: Brand: MEDLINE

## (undated) DEVICE — KIT,ANTI FOG,W/SPONGE & FLUID,SOFT PACK: Brand: MEDLINE

## (undated) DEVICE — TUBING 1895522 5PK STRAIGHTSHOT TO XPS: Brand: STRAIGHTSHOT®

## (undated) DEVICE — SYRINGE IRRIG 60ML SFT PLIABLE BLB EZ TO GRP 1 HND USE W/

## (undated) DEVICE — SURGICAL SUCTION CONNECTING TUBE WITH MALE CONNECTOR AND SUCTION CLAMP, 2 FT. LONG (.6 M), 5 MM I.D.: Brand: CONMED

## (undated) DEVICE — PATIENT TRACKER 9734887XOM NON-INVASIVE

## (undated) DEVICE — INSTRUMENT TRACKER 9733533XOM ENT 1PK

## (undated) DEVICE — ELECTRODE ES L6.5IN DIA2.4MM TIP L0.2IN S STL EXT INSUL NDL

## (undated) DEVICE — GLOVE ORTHO 7 1/2   MSG9475

## (undated) DEVICE — ENT: Brand: MEDLINE INDUSTRIES, INC.

## (undated) DEVICE — CODMAN® SURGICAL PATTIES 1/2" X 3" (1.27CM X 7.62CM): Brand: CODMAN®

## (undated) DEVICE — BLADE 1884080EM TRICUT 4MMX13CM M4 ROHS: Brand: FUSION®

## (undated) DEVICE — SUTURE ABSORBABLE MONOFILAMENT 4-0 SC-1 18 IN PLN GUT 1824H

## (undated) DEVICE — GOWN SIRUS NONREIN XL W/TWL: Brand: MEDLINE INDUSTRIES, INC.

## (undated) DEVICE — APPLICATOR,COTTON-TIP,WOOD,3,STRL: Brand: MEDLINE

## (undated) DEVICE — TUBING SUCT 10FR MAL ALUM SHFT FN CAP VENT UNIV CONN W/ OBT

## (undated) DEVICE — STAPLER SKIN H3.9MM WIRE DIA0.58MM CRWN 6.9MM 35 STPL ROT

## (undated) DEVICE — SYRINGE MED 10ML LUERLOCK TIP W/O SFTY DISP

## (undated) DEVICE — TOWEL,OR,DSP,ST,BLUE,STD,4/PK,20PK/CS: Brand: MEDLINE

## (undated) DEVICE — MERCY HEALTH WEST TURNOVER: Brand: MEDLINE INDUSTRIES, INC.

## (undated) DEVICE — AGENT HEMSTAT W2XL3IN OXIDIZED REGENERATED CELOS ABSRB

## (undated) DEVICE — 3M™ STERI-DRAPE™ INSTRUMENT POUCH 1018: Brand: STERI-DRAPE™

## (undated) DEVICE — SYRINGE MED 50ML LUERLOCK TIP

## (undated) DEVICE — GLOVE SURG SZ 9 L12IN FNGR THK126MIL CRM LTX FREE

## (undated) DEVICE — DRAPE THER FLUID WARMING 66X44 IN FLAT SLUSH DBL DISC ORS